# Patient Record
Sex: MALE | Race: WHITE | NOT HISPANIC OR LATINO | Employment: FULL TIME | URBAN - METROPOLITAN AREA
[De-identification: names, ages, dates, MRNs, and addresses within clinical notes are randomized per-mention and may not be internally consistent; named-entity substitution may affect disease eponyms.]

---

## 2024-01-29 ENCOUNTER — OFFICE VISIT (OUTPATIENT)
Dept: ORTHOPEDIC SURGERY | Facility: CLINIC | Age: 64
End: 2024-01-29
Payer: COMMERCIAL

## 2024-01-29 VITALS — WEIGHT: 203 LBS | HEIGHT: 72 IN | BODY MASS INDEX: 27.5 KG/M2

## 2024-01-29 DIAGNOSIS — S43.004A DISLOCATION OF RIGHT SHOULDER JOINT, INITIAL ENCOUNTER: Primary | ICD-10-CM

## 2024-01-29 PROCEDURE — 1036F TOBACCO NON-USER: CPT | Performed by: ORTHOPAEDIC SURGERY

## 2024-01-29 PROCEDURE — 99203 OFFICE O/P NEW LOW 30 MIN: CPT | Performed by: ORTHOPAEDIC SURGERY

## 2024-01-29 ASSESSMENT — PAIN - FUNCTIONAL ASSESSMENT: PAIN_FUNCTIONAL_ASSESSMENT: 0-10

## 2024-01-29 ASSESSMENT — PAIN DESCRIPTION - DESCRIPTORS: DESCRIPTORS: ACHING;BURNING

## 2024-01-29 ASSESSMENT — PAIN SCALES - GENERAL: PAINLEVEL_OUTOF10: 1

## 2024-01-29 NOTE — PROGRESS NOTES
Chief complaint is right [place] first-time dislocation required manipulative reduction  He is right-hand dominant he has no numbness or tingling  He has been in a sling    Past medical,family and social histories have been reviewed and are up to date.  All other body systems have been reviewed and are negative for complaint.  Constitutional: Well-developed well-nourished   Eyes: Sclerae anicteric, pupils equal and round  HENT: Normocephalic atraumatic  Cardiovascular: Pulses full, regular rate and rhythm  Respiratory: Breathing not labored, no wheezing  Integumentary: Skin intact, no lesions or rashes  Neurological: Sensation intact, no gross strength deficits, reflexes equal  Psychiatric: Alert oriented and appropriate  Hematologic/lymphatic: No lymphadenopathy  Right shoulder: No mass or swelling has limited active motion but seems to have cuff integrity  Outside x-ray report shows anterior shoulder dislocation reduced with a small Hill-Sachs lesion    Impressions first-time shoulder dislocation seems to have intact cuff  Gradual wean from sling work on some range of motion exercises like to get a good exam in 2 to 3 weeks may need further imaging  All questions answered

## 2024-02-19 ENCOUNTER — OFFICE VISIT (OUTPATIENT)
Dept: ORTHOPEDIC SURGERY | Facility: CLINIC | Age: 64
End: 2024-02-19
Payer: COMMERCIAL

## 2024-02-19 ENCOUNTER — HOSPITAL ENCOUNTER (OUTPATIENT)
Dept: RADIOLOGY | Facility: CLINIC | Age: 64
Discharge: HOME | End: 2024-02-19
Payer: COMMERCIAL

## 2024-02-19 DIAGNOSIS — S43.004D DISLOCATION OF RIGHT SHOULDER JOINT, SUBSEQUENT ENCOUNTER: ICD-10-CM

## 2024-02-19 PROCEDURE — 73030 X-RAY EXAM OF SHOULDER: CPT | Mod: RT

## 2024-02-19 PROCEDURE — 73030 X-RAY EXAM OF SHOULDER: CPT | Mod: RIGHT SIDE | Performed by: RADIOLOGY

## 2024-02-19 PROCEDURE — 1036F TOBACCO NON-USER: CPT | Performed by: ORTHOPAEDIC SURGERY

## 2024-02-19 PROCEDURE — 99213 OFFICE O/P EST LOW 20 MIN: CPT | Performed by: ORTHOPAEDIC SURGERY

## 2024-02-19 ASSESSMENT — PAIN - FUNCTIONAL ASSESSMENT: PAIN_FUNCTIONAL_ASSESSMENT: 0-10

## 2024-02-19 ASSESSMENT — PAIN SCALES - GENERAL: PAINLEVEL_OUTOF10: 3

## 2024-02-19 NOTE — PROGRESS NOTES
Follow-up right shoulder dislocation he feels better he is working with a  still hurts though he has not returned to skiing  Past medical,family and social histories have been reviewed and are up to date.  All other body systems have been reviewed and are negative for complaint.  Constitutional: Well-developed well-nourished   Eyes: Sclerae anicteric, pupils equal and round  HENT: Normocephalic atraumatic  Cardiovascular: Pulses full, regular rate and rhythm  Respiratory: Breathing not labored, no wheezing  Integumentary: Skin intact, no lesions or rashes  Neurological: Sensation intact, no gross strength deficits, reflexes equal  Psychiatric: Alert oriented and appropriate  Hematologic/lymphatic: No lymphadenopathy  Right shoulder: There is no mass or swelling he has some slight apprehension sign he has good cuff strength  Impression status post shoulder dislocation cuff seems to be intact I agree with continuing his controlled exercises to avoid the apprehension position expect things to to normalize over the next 4 to 6 weeks if symptoms persist may need further investigation with MRI  All questions answered

## 2025-06-22 ENCOUNTER — OFFICE VISIT (OUTPATIENT)
Dept: URGENT CARE | Age: 65
End: 2025-06-22

## 2025-06-22 VITALS
OXYGEN SATURATION: 99 % | HEIGHT: 72 IN | HEART RATE: 65 BPM | WEIGHT: 193 LBS | SYSTOLIC BLOOD PRESSURE: 184 MMHG | DIASTOLIC BLOOD PRESSURE: 103 MMHG | RESPIRATION RATE: 17 BRPM | BODY MASS INDEX: 26.14 KG/M2 | TEMPERATURE: 96.5 F

## 2025-06-22 DIAGNOSIS — K08.89 TOOTHACHE: Primary | ICD-10-CM

## 2025-06-22 RX ORDER — PENICILLIN V POTASSIUM 500 MG/1
500 TABLET, FILM COATED ORAL
Qty: 20 TABLET | Refills: 0 | Status: SHIPPED | OUTPATIENT
Start: 2025-06-22 | End: 2025-07-02

## 2025-06-22 ASSESSMENT — PATIENT HEALTH QUESTIONNAIRE - PHQ9
1. LITTLE INTEREST OR PLEASURE IN DOING THINGS: NOT AT ALL
2. FEELING DOWN, DEPRESSED OR HOPELESS: NOT AT ALL
SUM OF ALL RESPONSES TO PHQ9 QUESTIONS 1 AND 2: 0

## 2025-06-22 ASSESSMENT — ENCOUNTER SYMPTOMS
DEPRESSION: 0
OCCASIONAL FEELINGS OF UNSTEADINESS: 0
FEVER: 0
LOSS OF SENSATION IN FEET: 0

## 2025-06-22 ASSESSMENT — PAIN SCALES - GENERAL: PAINLEVEL_OUTOF10: 10-WORST PAIN EVER

## 2025-06-22 NOTE — PATIENT INSTRUCTIONS
Take antibiotic until gone and alternate Tylenol and Aleve every 6 hours for pain.  Call tomorrow for appointment with oral surgeon.

## 2025-06-22 NOTE — PROGRESS NOTES
Subjective   Patient ID: Fernie Godinez is a 64 y.o. male. They present today with a chief complaint of Dental Pain (Toothache since 6/20/25).    History of Present Illness  63 YO M c/o lower L toothache x 2 days, saw his dentist and they referred him to an oral surgeon.  Pain is only less with chewing ice.       Dental Pain  Associated symptoms: no drooling and no fever        Past Medical History  Allergies as of 06/22/2025    (No Known Allergies)       Prescriptions Prior to Admission[1]     Medical History[2]    Surgical History[3]     reports that he has never smoked. He has never used smokeless tobacco. He reports current alcohol use of about 1.0 standard drink of alcohol per week. He reports that he does not use drugs.    Review of Systems  Review of Systems   Constitutional:  Negative for fever.   HENT:  Positive for dental problem. Negative for drooling and ear pain.                                   Objective    Vitals:    06/22/25 1316 06/22/25 1322   BP: (!) 188/96 (!) 184/103   BP Location: Left arm    Patient Position: Sitting    BP Cuff Size: Adult    Pulse: 65    Resp: 17    Temp: 35.8 °C (96.5 °F)    TempSrc: Oral    SpO2: 99%    Weight: 87.5 kg (193 lb)    Height: 1.829 m (6')      No LMP for male patient.    Physical Exam  Vitals and nursing note reviewed.   Constitutional:       Appearance: Normal appearance.   HENT:      Mouth/Throat:      Mouth: Mucous membranes are moist.      Dentition: Does not have dentures. Dental tenderness present. No dental caries, dental abscesses or gum lesions.        Comments: + dental pain cavity filling, no gingival swelling or abscess noted.   Eyes:      Conjunctiva/sclera: Conjunctivae normal.   Cardiovascular:      Rate and Rhythm: Normal rate and regular rhythm.      Heart sounds: Normal heart sounds.   Pulmonary:      Effort: Pulmonary effort is normal.      Breath sounds: Normal breath sounds.   Musculoskeletal:      Cervical back: Normal range of motion  and neck supple.   Neurological:      Mental Status: He is alert and oriented to person, place, and time.         Procedures    Point of Care Test & Imaging Results from this visit  No results found for this visit on 06/22/25.   Imaging  No results found.    Cardiology, Vascular, and Other Imaging  No other imaging results found for the past 2 days      Diagnostic study results (if any) were reviewed by Dunia Madison PA-C.    Assessment/Plan   Allergies, medications, history, and pertinent labs/EKGs/Imaging reviewed by Dunia Madison PA-C.     Medical Decision Making      Orders and Diagnoses  Diagnoses and all orders for this visit:  Toothache  -     penicillin v potassium (Veetid) 500 mg tablet; Take 1 tablet (500 mg) by mouth 2 times daily (morning and midday) for 10 days.      Medical Admin Record      Patient disposition: Home    Electronically signed by Dunia Madison PA-C  1:38 PM           [1] (Not in a hospital admission)  [2]   Past Medical History:  Diagnosis Date    Anxiety disorder, unspecified     Anxiety    Other conditions influencing health status 11/15/2016    Complete Colonoscopy    Paresthesia of skin 11/15/2016    Tingling    Personal history of diseases of the skin and subcutaneous tissue 03/23/2022    History of cellulitis    Personal history of other (healed) physical injury and trauma 11/17/2017    History of strain of back    Personal history of other diseases of the digestive system 02/23/2022    History of gastroesophageal reflux (GERD)    Personal history of other drug therapy     History of influenza vaccination    Personal history of other infectious and parasitic diseases 11/19/2018    History of herpes simplex infection    Personal history of other specified conditions 11/17/2017    History of epistaxis    Strain of muscle, fascia and tendon of lower back, initial encounter 01/24/2022    Low back strain   [3]   Past Surgical History:  Procedure Laterality Date    HERNIA REPAIR   11/15/2016    Hernia Repair

## 2025-06-23 ENCOUNTER — TELEPHONE (OUTPATIENT)
Dept: URGENT CARE | Age: 65
End: 2025-06-23